# Patient Record
Sex: FEMALE | Race: OTHER | NOT HISPANIC OR LATINO
[De-identification: names, ages, dates, MRNs, and addresses within clinical notes are randomized per-mention and may not be internally consistent; named-entity substitution may affect disease eponyms.]

---

## 2021-11-19 ENCOUNTER — APPOINTMENT (OUTPATIENT)
Dept: INTERNAL MEDICINE | Facility: CLINIC | Age: 51
End: 2021-11-19
Payer: MEDICAID

## 2021-11-19 VITALS
DIASTOLIC BLOOD PRESSURE: 70 MMHG | SYSTOLIC BLOOD PRESSURE: 110 MMHG | HEART RATE: 66 BPM | RESPIRATION RATE: 12 BRPM | OXYGEN SATURATION: 97 % | TEMPERATURE: 98 F

## 2021-11-19 VITALS — HEIGHT: 64 IN | BODY MASS INDEX: 23.05 KG/M2 | WEIGHT: 135 LBS

## 2021-11-19 DIAGNOSIS — Z82.3 FAMILY HISTORY OF STROKE: ICD-10-CM

## 2021-11-19 PROCEDURE — 90686 IIV4 VACC NO PRSV 0.5 ML IM: CPT

## 2021-11-19 PROCEDURE — G0008: CPT

## 2021-11-19 PROCEDURE — 99203 OFFICE O/P NEW LOW 30 MIN: CPT | Mod: 25

## 2021-11-19 RX ORDER — ELECTROLYTES/DEXTROSE
SOLUTION, ORAL ORAL
Refills: 0 | Status: ACTIVE | COMMUNITY
Start: 2021-11-19

## 2021-11-19 RX ORDER — ADHESIVE TAPE 3"X 2.3 YD
50 MCG TAPE, NON-MEDICATED TOPICAL
Refills: 0 | Status: ACTIVE | COMMUNITY
Start: 2021-11-19

## 2021-11-19 NOTE — HISTORY OF PRESENT ILLNESS
[FreeTextEntry8] : ZACKERY MARTELL is a 51 year old female who presents for new patient focused visit, requesting to review her recent labwork, specifically to discuss her cholesterol. She recently emigrated from [place] ~ 1 yr ago; saw new PCP in 9/2021 for physical, transferring care to this office. She has a history of hyperlipidemia, was on diet modifications and "one medication" while in [place]; in early 2020 repeat labs in [facility] were reportedly normal and she believes she was told she could stop the med; cannot recall name of med.\par 9/2021 labs cholesterol elevated, initiated on Atorvastatin but developed moderate achy pains in lower back; stopped med after 2 weeks and body aches resolved. She admits need to cut down on red meat, fried foods. Her mom had h/o CABG ~age 66 and CVA at age 68.\par Denies any chest pains, shortness of breath, dizziness, headaches.

## 2021-11-19 NOTE — PHYSICAL EXAM
[Normal] : no acute distress, well nourished, well developed and well-appearing [Normal Affect] : the affect was normal [Alert and Oriented x3] : oriented to person, place, and time [Normal Insight/Judgement] : insight and judgment were intact [de-identified] : ?generalized restlnessness noted--bilateral arms/torso appear to have involuntary jerking/movements

## 2021-12-23 ENCOUNTER — NON-APPOINTMENT (OUTPATIENT)
Age: 51
End: 2021-12-23

## 2022-01-19 ENCOUNTER — APPOINTMENT (OUTPATIENT)
Dept: INTERNAL MEDICINE | Facility: CLINIC | Age: 52
End: 2022-01-19
Payer: MEDICAID

## 2022-01-19 VITALS
OXYGEN SATURATION: 97 % | TEMPERATURE: 98.2 F | DIASTOLIC BLOOD PRESSURE: 70 MMHG | RESPIRATION RATE: 12 BRPM | SYSTOLIC BLOOD PRESSURE: 120 MMHG | HEART RATE: 62 BPM

## 2022-01-19 PROCEDURE — 99213 OFFICE O/P EST LOW 20 MIN: CPT | Mod: 25

## 2022-01-19 NOTE — PLAN
[FreeTextEntry1] : COVID19 recovered.\par labs to f/u on lipids, etc. Plan to Rx Atorvastatin at 20 mg if LDL controlled, increase dose if needed.\par F/u for CPE

## 2022-01-19 NOTE — HISTORY OF PRESENT ILLNESS
[FreeTextEntry1] : followup chronic condition/med renewal [de-identified] : ZACKERY MARTELL is a 51 year old female with history of hyperlipidemia who presents for medical followup. She has been taking Atorvastatin 20 from [facility] for HLD, ran out of it 3 days ago. Last visit she said she had body aches on Atorvastatin, but now thinks it was a different medication.\jose miguel Had COVID in 12/2021, fully recovered. She is fully vaccinated.

## 2022-01-21 LAB
ALBUMIN SERPL ELPH-MCNC: 4.7 G/DL
ALP BLD-CCNC: 83 U/L
ALT SERPL-CCNC: 11 U/L
ANION GAP SERPL CALC-SCNC: 14 MMOL/L
AST SERPL-CCNC: 15 U/L
BASOPHILS # BLD AUTO: 0.06 K/UL
BASOPHILS NFR BLD AUTO: 1 %
BILIRUB SERPL-MCNC: 0.5 MG/DL
BUN SERPL-MCNC: 15 MG/DL
CALCIUM SERPL-MCNC: 10.3 MG/DL
CHLORIDE SERPL-SCNC: 105 MMOL/L
CHOLEST SERPL-MCNC: 185 MG/DL
CO2 SERPL-SCNC: 24 MMOL/L
CREAT SERPL-MCNC: 0.89 MG/DL
EOSINOPHIL # BLD AUTO: 0.29 K/UL
EOSINOPHIL NFR BLD AUTO: 5 %
ESTIMATED AVERAGE GLUCOSE: 114 MG/DL
GLUCOSE SERPL-MCNC: 88 MG/DL
HBA1C MFR BLD HPLC: 5.6 %
HCT VFR BLD CALC: 39.3 %
HDLC SERPL-MCNC: 57 MG/DL
HGB BLD-MCNC: 12.5 G/DL
IMM GRANULOCYTES NFR BLD AUTO: 0.2 %
LDLC SERPL CALC-MCNC: 103 MG/DL
LYMPHOCYTES # BLD AUTO: 2.02 K/UL
LYMPHOCYTES NFR BLD AUTO: 34.6 %
MAN DIFF?: NORMAL
MCHC RBC-ENTMCNC: 30.3 PG
MCHC RBC-ENTMCNC: 31.8 GM/DL
MCV RBC AUTO: 95.4 FL
MONOCYTES # BLD AUTO: 0.43 K/UL
MONOCYTES NFR BLD AUTO: 7.4 %
NEUTROPHILS # BLD AUTO: 3.02 K/UL
NEUTROPHILS NFR BLD AUTO: 51.8 %
NONHDLC SERPL-MCNC: 128 MG/DL
PLATELET # BLD AUTO: 310 K/UL
POTASSIUM SERPL-SCNC: 5.4 MMOL/L
PROT SERPL-MCNC: 7.7 G/DL
RBC # BLD: 4.12 M/UL
RBC # FLD: 13.3 %
SODIUM SERPL-SCNC: 142 MMOL/L
TRIGL SERPL-MCNC: 126 MG/DL
TSH SERPL-ACNC: 0.77 UIU/ML
WBC # FLD AUTO: 5.83 K/UL

## 2022-02-14 ENCOUNTER — APPOINTMENT (OUTPATIENT)
Dept: INTERNAL MEDICINE | Facility: CLINIC | Age: 52
End: 2022-02-14
Payer: MEDICAID

## 2022-02-14 VITALS — BODY MASS INDEX: 22.96 KG/M2 | HEIGHT: 64 IN | WEIGHT: 134.5 LBS

## 2022-02-14 VITALS
DIASTOLIC BLOOD PRESSURE: 70 MMHG | RESPIRATION RATE: 12 BRPM | HEART RATE: 80 BPM | OXYGEN SATURATION: 98 % | SYSTOLIC BLOOD PRESSURE: 118 MMHG

## 2022-02-14 PROCEDURE — 99214 OFFICE O/P EST MOD 30 MIN: CPT

## 2022-02-14 NOTE — PHYSICAL EXAM
[No JVD] : no jugular venous distention [Coordination Grossly Intact] : coordination grossly intact [No Focal Deficits] : no focal deficits [Normal Gait] : normal gait [Normal] : affect was normal and insight and judgment were intact [de-identified] : involuntary tremors/jerking of upper extremities

## 2022-02-14 NOTE — PLAN
[FreeTextEntry1] : #Involuntary jerking: per patient has had chronically without change, at this time defers workup, will discuss again during upcoming physical\par #HLD: on Atorvastatin, low fat diet.\par F/u for next available CPE

## 2022-02-28 ENCOUNTER — NON-APPOINTMENT (OUTPATIENT)
Age: 52
End: 2022-02-28

## 2022-02-28 ENCOUNTER — APPOINTMENT (OUTPATIENT)
Dept: INTERNAL MEDICINE | Facility: CLINIC | Age: 52
End: 2022-02-28
Payer: MEDICAID

## 2022-02-28 VITALS
HEART RATE: 68 BPM | SYSTOLIC BLOOD PRESSURE: 112 MMHG | OXYGEN SATURATION: 96 % | DIASTOLIC BLOOD PRESSURE: 70 MMHG | RESPIRATION RATE: 12 BRPM | TEMPERATURE: 98.2 F

## 2022-02-28 VITALS — WEIGHT: 135 LBS | HEIGHT: 64 IN | BODY MASS INDEX: 23.05 KG/M2

## 2022-02-28 DIAGNOSIS — Z23 ENCOUNTER FOR IMMUNIZATION: ICD-10-CM

## 2022-02-28 DIAGNOSIS — Z00.00 ENCOUNTER FOR GENERAL ADULT MEDICAL EXAMINATION W/OUT ABNORMAL FINDINGS: ICD-10-CM

## 2022-02-28 PROCEDURE — 99396 PREV VISIT EST AGE 40-64: CPT

## 2022-02-28 PROCEDURE — 99214 OFFICE O/P EST MOD 30 MIN: CPT | Mod: 25

## 2022-02-28 RX ORDER — SERTRALINE HYDROCHLORIDE 50 MG/1
50 TABLET, FILM COATED ORAL
Qty: 30 | Refills: 0 | Status: COMPLETED | COMMUNITY
Start: 2021-10-29

## 2022-02-28 RX ORDER — PSYLLIUM HUSK 0.4 G
1000-800 CAPSULE ORAL
Qty: 30 | Refills: 0 | Status: COMPLETED | COMMUNITY
Start: 2021-10-05

## 2022-02-28 RX ORDER — AMOXICILLIN 500 MG/1
500 CAPSULE ORAL
Qty: 30 | Refills: 0 | Status: COMPLETED | COMMUNITY
Start: 2021-11-25

## 2022-02-28 NOTE — HEALTH RISK ASSESSMENT
[Very Good] : ~his/her~  mood as very good [Never] : Never [No] : In the past 12 months have you used drugs other than those required for medical reasons? No [No falls in past year] : Patient reported no falls in the past year [0] : 2) Feeling down, depressed, or hopeless: Not at all (0) [PHQ-2 Negative - No further assessment needed] : PHQ-2 Negative - No further assessment needed [Patient reported mammogram was normal] : Patient reported mammogram was normal [Patient reported PAP Smear was normal] : Patient reported PAP Smear was normal [HIV test declined] : HIV test declined [Hepatitis C test declined] : Hepatitis C test declined [None] : None [With Family] : lives with family [Fully functional (bathing, dressing, toileting, transferring, walking, feeding)] : Fully functional (bathing, dressing, toileting, transferring, walking, feeding) [Fully functional (using the telephone, shopping, preparing meals, housekeeping, doing laundry, using] : Fully functional and needs no help or supervision to perform IADLs (using the telephone, shopping, preparing meals, housekeeping, doing laundry, using transportation, managing medications and managing finances) [Reports normal functional visual acuity (ie: able to read med bottle)] : Reports normal functional visual acuity [Smoke Detector] : smoke detector [Carbon Monoxide Detector] : carbon monoxide detector [Safety elements used in home] : safety elements used in home [Seat Belt] :  uses seat belt [Sunscreen] : uses sunscreen [de-identified] : no exercise currently [de-identified] : balanced/healthy [[id number]] : 0 [Change in mental status noted] : No change in mental status noted [Language] : denies difficulty with language [Behavior] : denies difficulty with behavior [Learning/Retaining New Information] : denies difficulty learning/retaining new information [Handling Complex Tasks] : denies difficulty handling complex tasks [Reasoning] : denies difficulty with reasoning [Spatial Ability and Orientation] : denies difficulty with spatial ability and orientation [Reports changes in hearing] : Reports no changes in hearing [Reports changes in vision] : Reports no changes in vision [Reports changes in dental health] : Reports no changes in dental health [TB Exposure] : is not being exposed to tuberculosis [MammogramDate] : 01/20 [PapSmearDate] : 01/20 [ColonoscopyComments] : never had, referred

## 2022-02-28 NOTE — HISTORY OF PRESENT ILLNESS
[FreeTextEntry1] : here for physical [de-identified] : ZACKERY MARTELL is a 51 year old female with history of Hyperlipidemia who presents for annual comprehensive exam. She feels well today, denies any complaints. She moved from [place] ~ 2 years ago, is overdue for some routine tests. \par She has had jerking/movements in her hands/arms over the past few years, but more prominent recently for which her family had taken her to a psychiatrist who initiated her on Sertraline, thinking this was perhaps caused by Anxiety. She doesn't feel anything has changed on Sertraline. Denies feeling anxious or depressed.

## 2022-02-28 NOTE — PHYSICAL EXAM
[Normal Appearance] : normal in appearance [No Masses] : no palpable masses [No Nipple Discharge] : no nipple discharge [No Axillary Lymphadenopathy] : no axillary lymphadenopathy [Coordination Grossly Intact] : coordination grossly intact [No Focal Deficits] : no focal deficits [Normal Gait] : normal gait [Deep Tendon Reflexes (DTR)] : deep tendon reflexes were 2+ and symmetric [Normal] : affect was normal and insight and judgment were intact [de-identified] : involuntary twitching/movements of b/l upper extremities and neck movements--choreiform like. No rigidity.

## 2022-02-28 NOTE — REVIEW OF SYSTEMS
[Negative] : Psychiatric [Headache] : no headache [Dizziness] : no dizziness [Fainting] : no fainting [Confusion] : no confusion [Memory Loss] : no memory loss [Unsteady Walking] : no ataxia

## 2022-02-28 NOTE — PLAN
[FreeTextEntry1] : UTD with covid and flu vaccines. She will consider Shingrix\par Mammogram Rx\par F/u with gyn in 1 yr for PAP smear\par Livia-menopausal--menses irregular--LMP last week, prior to that was ~ 3 months ago. \par F/u 3m f/u lipids, check Vit D, UA.

## 2022-04-21 ENCOUNTER — APPOINTMENT (OUTPATIENT)
Dept: GASTROENTEROLOGY | Facility: CLINIC | Age: 52
End: 2022-04-21

## 2022-05-04 ENCOUNTER — APPOINTMENT (OUTPATIENT)
Dept: NEUROLOGY | Facility: CLINIC | Age: 52
End: 2022-05-04

## 2022-05-24 ENCOUNTER — APPOINTMENT (OUTPATIENT)
Dept: NEUROLOGY | Facility: CLINIC | Age: 52
End: 2022-05-24

## 2022-05-24 ENCOUNTER — APPOINTMENT (OUTPATIENT)
Dept: INTERNAL MEDICINE | Facility: CLINIC | Age: 52
End: 2022-05-24
Payer: MEDICAID

## 2022-05-24 VITALS — BODY MASS INDEX: 23.39 KG/M2 | HEIGHT: 64 IN | WEIGHT: 137 LBS
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
## 2022-05-24 DIAGNOSIS — R25.8 OTHER ABNORMAL INVOLUNTARY MOVEMENTS: ICD-10-CM

## 2022-05-24 PROCEDURE — 99213 OFFICE O/P EST LOW 20 MIN: CPT

## 2022-05-24 NOTE — HISTORY OF PRESENT ILLNESS
[FreeTextEntry1] : s/p recent trip/fall [de-identified] : ZACKERY MARTELL is a 51 year old female with history of Hyperlipidemia who presents for fol;owup appt. Accompaniedby her .\par 1 week ago while at [employer], she forgot keys/phone in car, was walking home (was closeby to store), and tripped/fell face first--contacted the ground with right side of forehead, chin, bilateral knees. Denies any loss of consciousness. Had scrapes on knees, covered with bandages, has been applying antibiotic cream. \par SHe has upcoming appt in 2 days with neurologist for evaluation of her abnormal bodily movements.

## 2022-05-24 NOTE — PHYSICAL EXAM
[Normal] : normal rate, regular rhythm, normal S1 and S2 and no murmur heard [No Joint Swelling] : no joint swelling [Grossly Normal Strength/Tone] : grossly normal strength/tone [No Focal Deficits] : no focal deficits [Normal Affect] : the affect was normal [Normal Insight/Judgement] : insight and judgment were intact [de-identified] : right knee--healed abrasion, left knee ~ 1 cm abrasion--clean/dry, bandaged. chin healed abrasion. Normal palpatory exam of head. [de-identified] : some incoordination in walking--short steps

## 2022-05-24 NOTE — PLAN
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 She will consider Shingrix\par Mammogram reminded her to schedule.

## 2022-05-26 ENCOUNTER — NON-APPOINTMENT (OUTPATIENT)
Age: 52
End: 2022-05-26

## 2022-05-26 ENCOUNTER — LABORATORY RESULT (OUTPATIENT)
Age: 52
End: 2022-05-26

## 2022-05-26 ENCOUNTER — APPOINTMENT (OUTPATIENT)
Dept: NEUROLOGY | Facility: CLINIC | Age: 52
End: 2022-05-26
Payer: MEDICAID

## 2022-05-26 VITALS
HEIGHT: 64 IN | HEART RATE: 77 BPM | SYSTOLIC BLOOD PRESSURE: 100 MMHG | BODY MASS INDEX: 23.39 KG/M2 | WEIGHT: 137 LBS | DIASTOLIC BLOOD PRESSURE: 60 MMHG | TEMPERATURE: 97.8 F

## 2022-05-26 DIAGNOSIS — R29.6 REPEATED FALLS: ICD-10-CM

## 2022-05-26 PROCEDURE — 99203 OFFICE O/P NEW LOW 30 MIN: CPT

## 2022-05-26 NOTE — HISTORY OF PRESENT ILLNESS
[FreeTextEntry1] : 51-year-old woman with significant medical history, has been complaining for the last 6-7 years old intensifying persistent,excessive fidgeting, fingers, hands, feet are intermittently and uncontrollably moving. Not associated with pain, numbness, weakness, no history of any head trauma or infectious causes. No prior history of stroke or seizures.No trouble sleeping.\par Patient can by tightening the extremity control the movement, but eventually returns. She is has been falling more frequently,  reports She denies any difficulty using her extremities cooking, cleaning. Not dropping things.\par No change in memory,behavior, although has been more irritable for about a year or so, started on sertraline 25 mg once a day. No family history of similar complaints.

## 2022-05-26 NOTE — PHYSICAL EXAM
[General Appearance - Alert] : alert [General Appearance - In No Acute Distress] : in no acute distress [Oriented To Time, Place, And Person] : oriented to person, place, and time [Impaired Insight] : insight and judgment were intact [Affect] : the affect was normal [Person] : oriented to person [Place] : oriented to place [Time] : oriented to time [Concentration Intact] : normal concentrating ability [Visual Intact] : visual attention was ~T not ~L decreased [Naming Objects] : no difficulty naming common objects [Repeating Phrases] : no difficulty repeating a phrase [Writing A Sentence] : no difficulty writing a sentence [Fluency] : fluency intact [Comprehension] : comprehension intact [Reading] : reading intact [Past History] : adequate knowledge of personal past history [Cranial Nerves Optic (II)] : visual acuity intact bilaterally,  visual fields full to confrontation, pupils equal round and reactive to light [Cranial Nerves Oculomotor (III)] : extraocular motion intact [Cranial Nerves Trigeminal (V)] : facial sensation intact symmetrically [Cranial Nerves Facial (VII)] : face symmetrical [Cranial Nerves Vestibulocochlear (VIII)] : hearing was intact bilaterally [Cranial Nerves Glossopharyngeal (IX)] : tongue and palate midline [Cranial Nerves Accessory (XI - Cranial And Spinal)] : head turning and shoulder shrug symmetric [Cranial Nerves Hypoglossal (XII)] : there was no tongue deviation with protrusion [Motor Tone] : muscle tone was normal in all four extremities [Motor Strength] : muscle strength was normal in all four extremities [No Muscle Atrophy] : normal bulk in all four extremities [Paresis Pronator Drift Right-Sided] : no pronator drift on the right [Motor Strength Upper Extremities Bilaterally] : strength was normal in both upper extremities [Motor Strength Lower Extremities Bilaterally] : strength was normal in both lower extremities [Sensation Tactile Decrease] : light touch was intact [Sensation Pain / Temperature Decrease] : pain and temperature was intact [Proprioception] : proprioception was intact [Romberg's Sign] : Romberg's sign was negtive [Abnormal Walk] : normal gait [Balance] : balance was intact [Past-pointing] : there was no past-pointing [Dysdiadochokinesia Bilaterally] : not present [Coordination - Dysmetria Impaired Finger-to-Nose Bilateral] : present bilaterally [Coordination - Dysmetria Impaired Finger-to-Nose Right Only] : present on the ride side [Coordination - Dysmetria Impaired Finger-to-Nose Left Only] : present on the left side [Coordination - Dysmetria Impaired Heel-to-Shin Bilateral] : not present [2+] : Ankle jerk left 2+ [Plantar Reflex Right Only] : normal on the right [Plantar Reflex Left Only] : normal on the left [___] : absent on the right [___] : absent on the left [FreeTextEntry8] : there are persistent,  intermittent movements of the fingers, toes, hands [Neck Appearance] : the appearance of the neck was normal [] : the neck was supple [Neck Cervical Mass (___cm)] : no neck mass was observed [No Visual Abnormalities] : no visible abnormalities [Normal Lordosis] : normal lordosis [No Scoliosis] : no scoliosis [No Tenderness to Palpation] : no spine tenderness on palpation [No Masses] : no masses [Full ROM] : full ROM [No Pain with ROM] : no pain with motion in any direction

## 2022-05-26 NOTE — DISCUSSION/SUMMARY
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 Unclear etiology, no history of stroke, injury to the head or infection processes. Recent blood work by her primary doctor, and normal thyroid function tests.Normal liver function tests.\par There is a recent history of increasing falls, raises the possibility of spinal cerebellar degeneration,Duval disease, rule out paraneoplastic, up to immune disorders. Rule out structural lesions of the brain.\par Plan:\par MRI of brain with and without gadolinium\par EEG\par ALEXANDRA, rheumatoid factor, antiphospholipid antibodies, anticardiolipin antibodies, SPEP, IPEP, paraneoplastic antibodies, ceruloplasmin, ace, lyme ab, B12,folate, esr, c reactive protein.

## 2022-05-27 ENCOUNTER — APPOINTMENT (OUTPATIENT)
Dept: NEUROLOGY | Facility: CLINIC | Age: 52
End: 2022-05-27
Payer: MEDICAID

## 2022-05-27 LAB
ALBUMIN SERPL ELPH-MCNC: 5 G/DL
ALP BLD-CCNC: 85 U/L
ALT SERPL-CCNC: 23 U/L
ANION GAP SERPL CALC-SCNC: 16 MMOL/L
AST SERPL-CCNC: 22 U/L
BILIRUB SERPL-MCNC: 0.5 MG/DL
BUN SERPL-MCNC: 17 MG/DL
CALCIUM SERPL-MCNC: 10 MG/DL
CHLORIDE SERPL-SCNC: 101 MMOL/L
CHOLEST SERPL-MCNC: 224 MG/DL
CO2 SERPL-SCNC: 24 MMOL/L
CREAT SERPL-MCNC: 0.74 MG/DL
EGFR: 97 ML/MIN/1.73M2
GLUCOSE SERPL-MCNC: 88 MG/DL
HDLC SERPL-MCNC: 73 MG/DL
LDLC SERPL CALC-MCNC: 130 MG/DL
NONHDLC SERPL-MCNC: 152 MG/DL
POTASSIUM SERPL-SCNC: 4.7 MMOL/L
PROT SERPL-MCNC: 7.6 G/DL
SODIUM SERPL-SCNC: 141 MMOL/L
TRIGL SERPL-MCNC: 108 MG/DL

## 2022-05-27 PROCEDURE — 95816 EEG AWAKE AND DROWSY: CPT

## 2022-05-31 LAB
ACE BLD-CCNC: 76 U/L
ALBUMIN MFR SERPL ELPH: 57.1 %
ALBUMIN SERPL-MCNC: 4.1 G/DL
ALBUMIN/GLOB SERPL: 1.3 RATIO
ALPHA1 GLOB MFR SERPL ELPH: 3.3 %
ALPHA1 GLOB SERPL ELPH-MCNC: 0.2 G/DL
ALPHA2 GLOB MFR SERPL ELPH: 10.1 %
ALPHA2 GLOB SERPL ELPH-MCNC: 0.7 G/DL
B-GLOBULIN MFR SERPL ELPH: 12.6 %
B-GLOBULIN SERPL ELPH-MCNC: 0.9 G/DL
CALCIUM SERPL-MCNC: 9.8 MG/DL
CARDIOLIPIN AB SER IA-ACNC: NEGATIVE
CERULOPLASMIN SERPL-MCNC: 27 MG/DL
CK SERPL-CCNC: 71 U/L
COPPER SERPL-MCNC: 148 UG/DL
CRP SERPL-MCNC: <3 MG/L
DEPRECATED KAPPA LC FREE/LAMBDA SER: 1.47 RATIO
DSDNA AB SER-ACNC: 20 IU/ML
ENA SCL70 IGG SER IA-ACNC: <0.2 AL
ERYTHROCYTE [SEDIMENTATION RATE] IN BLOOD BY WESTERGREN METHOD: 3 MM/HR
FOLATE SERPL-MCNC: 9.8 NG/ML
GAMMA GLOB FLD ELPH-MCNC: 1.2 G/DL
GAMMA GLOB MFR SERPL ELPH: 16.9 %
IGA SER QL IEP: 196 MG/DL
IGG SER QL IEP: 1246 MG/DL
IGM SER QL IEP: 58 MG/DL
INTERPRETATION SERPL IEP-IMP: NORMAL
KAPPA LC CSF-MCNC: 1.35 MG/DL
KAPPA LC SERPL-MCNC: 1.99 MG/DL
M PROTEIN SPEC IFE-MCNC: NORMAL
PARATHYROID HORMONE INTACT: 70 PG/ML
PROT SERPL-MCNC: 7.2 G/DL
PROT SERPL-MCNC: 7.2 G/DL
VIT B12 SERPL-MCNC: 227 PG/ML

## 2022-06-01 DIAGNOSIS — E53.8 DEFICIENCY OF OTHER SPECIFIED B GROUP VITAMINS: ICD-10-CM

## 2022-06-01 LAB
ANA PAT FLD IF-IMP: ABNORMAL
ANA SER IF-ACNC: ABNORMAL

## 2022-06-03 LAB — PARANEOPLASTIC AB PNL SER: NORMAL

## 2022-06-07 ENCOUNTER — APPOINTMENT (OUTPATIENT)
Dept: MRI IMAGING | Facility: CLINIC | Age: 52
End: 2022-06-07
Payer: MEDICAID

## 2022-06-07 ENCOUNTER — OUTPATIENT (OUTPATIENT)
Dept: OUTPATIENT SERVICES | Facility: HOSPITAL | Age: 52
LOS: 1 days | End: 2022-06-07
Payer: MEDICAID

## 2022-06-07 DIAGNOSIS — R29.6 REPEATED FALLS: ICD-10-CM

## 2022-06-07 DIAGNOSIS — R25.8 OTHER ABNORMAL INVOLUNTARY MOVEMENTS: ICD-10-CM

## 2022-06-07 PROCEDURE — 70553 MRI BRAIN STEM W/O & W/DYE: CPT | Mod: 26

## 2022-06-07 PROCEDURE — 70553 MRI BRAIN STEM W/O & W/DYE: CPT

## 2022-06-07 PROCEDURE — A9585: CPT

## 2022-06-09 ENCOUNTER — APPOINTMENT (OUTPATIENT)
Dept: NEUROLOGY | Facility: CLINIC | Age: 52
End: 2022-06-09

## 2022-06-13 ENCOUNTER — APPOINTMENT (OUTPATIENT)
Dept: NEUROLOGY | Facility: CLINIC | Age: 52
End: 2022-06-13
Payer: MEDICAID

## 2022-06-13 VITALS
BODY MASS INDEX: 23.39 KG/M2 | DIASTOLIC BLOOD PRESSURE: 74 MMHG | SYSTOLIC BLOOD PRESSURE: 117 MMHG | WEIGHT: 137 LBS | HEART RATE: 74 BPM | TEMPERATURE: 97.7 F | HEIGHT: 64 IN

## 2022-06-13 DIAGNOSIS — F42.9 OBSESSIVE-COMPULSIVE DISORDER, UNSPECIFIED: ICD-10-CM

## 2022-06-13 PROCEDURE — 99213 OFFICE O/P EST LOW 20 MIN: CPT

## 2022-06-13 NOTE — DATA REVIEWED
[de-identified] : MRI of brain performed June 8, 2022.  Impression: Nonspecific white matter changes.  No abnormal intraparenchymal or leptomeningeal enhancement.  No restricted diffusion.  No acute ischemic event.  Paranasal sinus inflammatory change with presence of secretions appreciated within the left sphenoid sinus. [de-identified] : EEG performed May 27, 2022, impression: Normal awake.

## 2022-06-13 NOTE — REVIEW OF SYSTEMS
[As Noted in HPI] : as noted in HPI [Sleep Disturbances] : sleep disturbances [Anxiety] : anxiety [Change In Personality] : personality change [Negative] : Heme/Lymph

## 2022-06-13 NOTE — HISTORY OF PRESENT ILLNESS
[FreeTextEntry1] : 52-year-old woman accompanied by her .  With complaints for the last several years, of increased anxiety, compulsive thinking, agitation, and as per  aggressive at times.  She also reports intermittent but recurrent\par , Twitches of the extremities.  Worse when she gets anxious.\par Recently evaluated, blood work shows borderline abnormal ALEXANDRA, 1-80, and a low vitamin B12 serum level of 227.  Patient advised and prescribed supplementation orally.\par

## 2022-06-13 NOTE — DISCUSSION/SUMMARY
[FreeTextEntry1] : 52-year-old woman history of compulsive thinking, question of obsessive-compulsive disorder, history of muscle twitches, examination unremarkable so far, MRI unremarkable, blood work except for low serum B12, unrevealing.\par Questionable Christal's disease, will refer for genetic testing.  \par Psychiatric referral for obsessive and compulsive thinking.\par Vitamin B12 deficient, on supplementation.  Repeat serum levels, in 2 months.\par Return to office, 3 months

## 2022-07-06 ENCOUNTER — APPOINTMENT (OUTPATIENT)
Dept: GASTROENTEROLOGY | Facility: CLINIC | Age: 52
End: 2022-07-06

## 2022-08-23 ENCOUNTER — APPOINTMENT (OUTPATIENT)
Dept: INTERNAL MEDICINE | Facility: CLINIC | Age: 52
End: 2022-08-23

## 2022-08-23 VITALS — WEIGHT: 142 LBS | HEIGHT: 64 IN | BODY MASS INDEX: 24.24 KG/M2

## 2022-08-23 PROCEDURE — 99213 OFFICE O/P EST LOW 20 MIN: CPT

## 2022-08-23 RX ORDER — IBUPROFEN 600 MG/1
600 TABLET, FILM COATED ORAL
Qty: 20 | Refills: 0 | Status: ACTIVE | COMMUNITY
Start: 2022-08-23 | End: 1900-01-01

## 2022-08-23 NOTE — HISTORY OF PRESENT ILLNESS
[FreeTextEntry8] : ZACKERY MARTELL is a 52 year old female who presents for evaluation of left thumb pain. Pain is localized to lateral side of left wrist/thumb, ~4-5/10, achy, constant.\par Onset ~ 1.5 weeks ago and has been persistent. No apparent trauma to the hand/wrist/thumb. She does have generalized repetitive movements in wrist/fingers from underlying neurological  disorder--follows with neurologist.

## 2022-08-23 NOTE — PLAN
[FreeTextEntry1] : Advised her on ice, Ibuprofen on full stomach as needed for pain, use thumb spica splint daily for 7 days (remove at night). If pain persists beyond a week then to call me t update, plan on evaluation with hand specialist.\par Needs to f/u with neurology to manage her movement disorder which is likely cause for her current MSK issue

## 2022-08-23 NOTE — PHYSICAL EXAM
[Normal] : no acute distress, well nourished, well developed and well-appearing [de-identified] : left hand/wrist: +tenderness/swelling along lateral aspect of thumb into wrist, +Finkelstein test with reproducible pain [de-identified] : frequent repetitive turning movements of bilateral wrists, fingers noted

## 2022-08-30 ENCOUNTER — APPOINTMENT (OUTPATIENT)
Dept: GASTROENTEROLOGY | Facility: CLINIC | Age: 52
End: 2022-08-30

## 2022-08-30 VITALS
TEMPERATURE: 97.5 F | OXYGEN SATURATION: 97 % | BODY MASS INDEX: 24.24 KG/M2 | HEART RATE: 69 BPM | WEIGHT: 142 LBS | SYSTOLIC BLOOD PRESSURE: 84 MMHG | DIASTOLIC BLOOD PRESSURE: 60 MMHG | HEIGHT: 64 IN

## 2022-08-30 DIAGNOSIS — Z12.11 ENCOUNTER FOR SCREENING FOR MALIGNANT NEOPLASM OF COLON: ICD-10-CM

## 2022-08-30 DIAGNOSIS — Z12.12 ENCOUNTER FOR SCREENING FOR MALIGNANT NEOPLASM OF COLON: ICD-10-CM

## 2022-08-30 PROCEDURE — 99203 OFFICE O/P NEW LOW 30 MIN: CPT

## 2022-08-30 RX ORDER — POLYETHYLENE GLYCOL-3350, SODIUM CHLORIDE, POTASSIUM CHLORIDE AND SODIUM BICARBONATE 420; 11.2; 5.72; 1.48 G/438.4G; G/438.4G; G/438.4G; G/438.4G
420 POWDER, FOR SOLUTION ORAL
Qty: 1 | Refills: 0 | Status: ACTIVE | COMMUNITY
Start: 2022-08-30 | End: 1900-01-01

## 2022-08-30 NOTE — HISTORY OF PRESENT ILLNESS
[FreeTextEntry1] : New patient presents to the office today to arrange for colonoscopy.  This will be her first screening exam.  Patient feels well offers no complaints of nausea vomiting fever chills rectal bleeding or melena.  Patient has no cardiac or pulmonary complaints.  Past medical history was reviewed.

## 2022-08-30 NOTE — ASSESSMENT
[FreeTextEntry1] : Impression and plan\par Patient came to the office today to arrange for colonoscopy.  The risks benefits alternatives and limitations were discussed.  I will make further suggestions after above testing is complete.  Patient's son was present during the examination to act as an .  Time was given to answer all questions.

## 2022-09-21 ENCOUNTER — APPOINTMENT (OUTPATIENT)
Dept: NEUROLOGY | Facility: CLINIC | Age: 52
End: 2022-09-21

## 2022-09-28 DIAGNOSIS — F41.9 ANXIETY DISORDER, UNSPECIFIED: ICD-10-CM

## 2022-11-02 ENCOUNTER — APPOINTMENT (OUTPATIENT)
Dept: GASTROENTEROLOGY | Facility: CLINIC | Age: 52
End: 2022-11-02

## 2022-11-23 ENCOUNTER — APPOINTMENT (OUTPATIENT)
Dept: INTERNAL MEDICINE | Facility: CLINIC | Age: 52
End: 2022-11-23

## 2022-11-23 VITALS
HEIGHT: 64 IN | BODY MASS INDEX: 24.41 KG/M2 | WEIGHT: 143 LBS | DIASTOLIC BLOOD PRESSURE: 60 MMHG | SYSTOLIC BLOOD PRESSURE: 110 MMHG

## 2022-11-23 DIAGNOSIS — Z20.822 ENCOUNTER FOR PREPROCEDURAL LABORATORY EXAMINATION: ICD-10-CM

## 2022-11-23 DIAGNOSIS — M54.50 LOW BACK PAIN, UNSPECIFIED: ICD-10-CM

## 2022-11-23 DIAGNOSIS — Z01.812 ENCOUNTER FOR PREPROCEDURAL LABORATORY EXAMINATION: ICD-10-CM

## 2022-11-23 PROCEDURE — 99214 OFFICE O/P EST MOD 30 MIN: CPT | Mod: 25

## 2022-11-23 RX ORDER — CYCLOBENZAPRINE HYDROCHLORIDE 10 MG/1
10 TABLET, FILM COATED ORAL EVERY 8 HOURS
Qty: 1 | Refills: 0 | Status: ACTIVE | COMMUNITY
Start: 2022-11-23 | End: 1900-01-01

## 2022-11-23 RX ORDER — MELOXICAM 15 MG/1
15 TABLET ORAL
Qty: 10 | Refills: 0 | Status: ACTIVE | COMMUNITY
Start: 2022-11-23 | End: 1900-01-01

## 2022-11-23 NOTE — PHYSICAL EXAM
[No Acute Distress] : no acute distress [Well Nourished] : well nourished [PERRL] : pupils equal round and reactive to light [EOMI] : extraocular movements intact [No Accessory Muscle Use] : no accessory muscle use [Clear to Auscultation] : lungs were clear to auscultation bilaterally [Regular Rhythm] : with a regular rhythm [Normal S1, S2] : normal S1 and S2 [No Murmur] : no murmur heard [No Spinal Tenderness] : no spinal tenderness [Normal] : the cranial nerves were intact [Sensation Tactile Decrease] : light touch was intact [2+] : left 2+ [Normal Affect] : the affect was normal [Normal Insight/Judgement] : insight and judgment were intact

## 2022-11-23 NOTE — HISTORY OF PRESENT ILLNESS
[FreeTextEntry8] : cc: R. sided LBP  \par \par 4-5 days of R. sided LBP radiating to R. buttock - occurred after spending several hours in kitchen \par feels dull, achy \par worse with sitting \par pain is 9/10 \par took 2-3 ibuprofen didn't help \par took 2 alleve didn't really help\par \par no f/chills \par no paresthesia \par no bowel or bladder incontinence \par

## 2022-11-30 ENCOUNTER — APPOINTMENT (OUTPATIENT)
Dept: GASTROENTEROLOGY | Facility: HOSPITAL | Age: 52
End: 2022-11-30

## 2023-05-02 ENCOUNTER — APPOINTMENT (OUTPATIENT)
Dept: INTERNAL MEDICINE | Facility: CLINIC | Age: 53
End: 2023-05-02
Payer: MEDICAID

## 2023-05-02 VITALS
RESPIRATION RATE: 14 BRPM | OXYGEN SATURATION: 97 % | HEART RATE: 94 BPM | TEMPERATURE: 97 F | SYSTOLIC BLOOD PRESSURE: 118 MMHG | DIASTOLIC BLOOD PRESSURE: 72 MMHG

## 2023-05-02 DIAGNOSIS — J06.9 ACUTE UPPER RESPIRATORY INFECTION, UNSPECIFIED: ICD-10-CM

## 2023-05-02 DIAGNOSIS — E78.5 HYPERLIPIDEMIA, UNSPECIFIED: ICD-10-CM

## 2023-05-02 LAB — S PYO AG SPEC QL IA: NEGATIVE

## 2023-05-02 PROCEDURE — 87880 STREP A ASSAY W/OPTIC: CPT | Mod: QW

## 2023-05-02 PROCEDURE — 99214 OFFICE O/P EST MOD 30 MIN: CPT | Mod: 25

## 2023-05-02 RX ORDER — ATORVASTATIN CALCIUM 20 MG/1
20 TABLET, FILM COATED ORAL
Qty: 90 | Refills: 3 | Status: ACTIVE | COMMUNITY
Start: 2021-10-05 | End: 1900-01-01

## 2023-05-02 NOTE — PLAN
[FreeTextEntry1] : #Acute URI: rapid strep negative; throat culture and nasal swab taken, will f/u. Advised to drink plenty of fluids, she defers nasal sprays, will try steam inhalation, Mucinex and continue Claritin.\par #Hyperlipidemia: restart Atorvastatin, low saturated fat diet, f/u for CPE/labs.

## 2023-05-02 NOTE — HISTORY OF PRESENT ILLNESS
[FreeTextEntry8] : ZACKERY MARTELL is a 52 year old female who presents for acute medical evaluation, c/o congestion, sore throat and to discuss recent lab results.\par -Returned from [place] 1 week ago; prior to leaving, she developed nasal congestion, cough; no fever/chills. Cough has resolved, still with some nasal congestion, but over past 2-3 days developed new sore throat, worse at nighttime/early mornings. Tried Mucinex a few times, thinks it helped reduce congestion; and daily Claritin x 2 days. \par She has not taken any COVID19 tests. \par She also had labs done while in [facility] last month, cholesterol is high. She stopped Atorvastatin ~6-7 months ago as she was under the impression since her cholesterol was normal that she could stop the medication.\par \par

## 2023-05-02 NOTE — DATA REVIEWED
[FreeTextEntry1] : LDL result from 4/18/2023 (from [facility] physician),

## 2023-05-02 NOTE — PHYSICAL EXAM
[No Acute Distress] : no acute distress [Well Nourished] : well nourished [Normal Sclera/Conjunctiva] : normal sclera/conjunctiva [Normal Outer Ear/Nose] : the outer ears and nose were normal in appearance [No Lymphadenopathy] : no lymphadenopathy [Normal] : normal rate, regular rhythm, normal S1 and S2 and no murmur heard [No Edema] : there was no peripheral edema [de-identified] : +nasal congestion, +post nasal drip. Normal appearance of pharynx, normal TMs, no sinus tenderness [de-identified] : jerking movements of head, extremities similar as prior exam

## 2023-05-05 LAB
BACTERIA THROAT CULT: NORMAL
INFLUENZA A RESULT: NOT DETECTED
INFLUENZA B RESULT: NOT DETECTED
RESP SYN VIRUS RESULT: NOT DETECTED
SARS-COV-2 RESULT: NOT DETECTED

## 2023-06-07 ENCOUNTER — RX RENEWAL (OUTPATIENT)
Age: 53
End: 2023-06-07

## 2023-06-21 ENCOUNTER — APPOINTMENT (OUTPATIENT)
Dept: INTERNAL MEDICINE | Facility: CLINIC | Age: 53
End: 2023-06-21
Payer: MEDICAID

## 2023-06-21 VITALS — BODY MASS INDEX: 24.75 KG/M2 | WEIGHT: 145 LBS | HEIGHT: 64 IN

## 2023-06-21 VITALS — TEMPERATURE: 97.6 F

## 2023-06-21 PROCEDURE — 99213 OFFICE O/P EST LOW 20 MIN: CPT

## 2023-06-21 NOTE — HISTORY OF PRESENT ILLNESS
[FreeTextEntry8] : ZACKERY MARTELL is a 53 year old female who presents for acute medical evaluation, c/o thumb weakness and hand swelling.\par Onset ~ 2 months ago--while washing a glass vase she put hand inside and vase broke, sustained cuts to right hand--described as 2 cuts to lateral hand, one cut on thumb and one cut "deep" into dorsum of hand near thumb. She declined to get medical attention at the time, self-treated with antibiotic cream and bandaids. The cuts healed,  but she noticed her thumb is "weak" and she has some residual swelling in her wrist.

## 2023-06-21 NOTE — PHYSICAL EXAM
[No Acute Distress] : no acute distress [No Respiratory Distress] : no respiratory distress  [No Extremity Clubbing/Cyanosis] : no extremity clubbing/cyanosis [Normal Gait] : normal gait [de-identified] : Right upper extremity: dorsal wrist slight nontender swelling, scar noted on dorsal hand with slight loss of muscle tone in thenar eminence, thumb reduced strength in extension/abduction

## 2023-06-21 NOTE — PLAN
[FreeTextEntry1] : Discussed with patient on above, suspect possible injury to hand tendon, recommend hand surgery evaluation. Referral placed and contacts provided. Advised to use ice and may try Naproxen or Motrin with food to help reduce swelling.

## 2023-06-30 ENCOUNTER — APPOINTMENT (OUTPATIENT)
Dept: ORTHOPEDIC SURGERY | Facility: CLINIC | Age: 53
End: 2023-06-30
Payer: MEDICAID

## 2023-06-30 PROCEDURE — 73130 X-RAY EXAM OF HAND: CPT | Mod: RT

## 2023-06-30 PROCEDURE — 99204 OFFICE O/P NEW MOD 45 MIN: CPT

## 2023-06-30 NOTE — HISTORY OF PRESENT ILLNESS
[8] : 8 [Dull/Aching] : dull/aching [Intermittent] : intermittent [Nothing helps with pain getting better] : Nothing helps with pain getting better [de-identified] : 6/30/23: 52yo RHD female (not working) presents for RIGHT hand pain and swelling x 2 months. She was washing a glass jug, and it broke, lacerating her hand. She also reports that she has been unable to straighten the tip of her thumb since the injury.\par \par Hx: ?OCD - awaiting psych eval. Involuntary jerking movements. Anxiety. HLD.\par Sx: removal of ovarian cyst. [] : no [FreeTextEntry5] : RONBRIGHT 53 year old F here for Rt hand, onset pain for about 2 months,  pt was washing a glass and it fell on her hand \par pt uses antibiotic cream for the cuts

## 2023-06-30 NOTE — ASSESSMENT
[FreeTextEntry1] : The condition was explained to the patient.\par Anticipate permanent inability to extend thumb IPJ and retropulse thumb with non-operative treatment.\par Given chronicity of injury, unlikely to be amenable to primary repair, would likely need reconstruction with PL autograft.\par - MRI R thumb to evaluate level of EPL laceration.\par \par F/u after MRI.

## 2023-06-30 NOTE — IMAGING
[de-identified] : RIGHT HAND\par multiple scars throughout dorsal hand. mild TTP to scar over dorsal radial wrist.\par skin intact. no swelling.\par mild TTP to 2nd webspace.\par wrist ROM: good extension, flexion.\par unable to extend thumb IPJ, good FPL. good finger extension, flex to full fist.\par SILT to median, ulnar, radial distribution. \par brisk cap refill all digits.\par no triggering.\par \par \par XRAYS OF RIGHT HAND: no acute displaced fracture or dislocation. mild djd of DRUJ. mild djd of MPJ of index, middle fingers.

## 2023-07-13 ENCOUNTER — FORM ENCOUNTER (OUTPATIENT)
Age: 53
End: 2023-07-13

## 2023-07-13 ENCOUNTER — APPOINTMENT (OUTPATIENT)
Dept: MRI IMAGING | Facility: CLINIC | Age: 53
End: 2023-07-13

## 2023-07-14 ENCOUNTER — APPOINTMENT (OUTPATIENT)
Dept: MRI IMAGING | Facility: CLINIC | Age: 53
End: 2023-07-14
Payer: MEDICAID

## 2023-07-14 PROCEDURE — 73221 MRI JOINT UPR EXTREM W/O DYE: CPT | Mod: RT

## 2023-07-17 ENCOUNTER — APPOINTMENT (OUTPATIENT)
Dept: ORTHOPEDIC SURGERY | Facility: CLINIC | Age: 53
End: 2023-07-17

## 2023-07-19 ENCOUNTER — NON-APPOINTMENT (OUTPATIENT)
Age: 53
End: 2023-07-19

## 2023-07-20 NOTE — ASSESSMENT
Physical Therapy Visit    Visit Type: Daily Treatment Note  Visit: 6  Referring Provider: Jana Albarado,*  Medical Diagnosis (from order): Diagnosis Information    Diagnosis  959.2 (ICD-9-CM) - S49.91XA (ICD-10-CM) - Injury of right shoulder, initial encounter         SUBJECTIVE                                                                                                               Patient states that he is doing well. He states that he is sore when lifting over 10# but doesn't have pain. Continues to work on exercises and they are helping.       OBJECTIVE                                                                                                                                       Treatment    Un-attended Electrical Stimulation (45793/): Interferential Current  - Purpose: pain relief  - Location: right shoulder   - Position: sitting  - Intensity: patient comfort  - Delivered via: pads  - In conjunction with: cold pack  - Duration: 15 minutes    Results: decreased pain      Therapeutic Exercise  -Arm bike 5 min  -pec stretch 3x30se  -trx rows x20  -shoulder abd palms down x10 8#  -bent rows 10# x20  -re-bounder x20 red      Therapeutic Activity  Overhead lifting 15# x20  Lifting from floor to waist 15# x20  Push pull sled 97# x2  Overhead shoulder press x15    Skilled input: verbal instruction/cues and tactile instruction/cues    Writer verbally educated and received verbal consent for hand placement, positioning of patient, and techniques to be performed today from patient for clothing adjustments for techniques, therapist position for techniques and hand placement and palpation for techniques as described above and how they are pertinent to the patient's plan of care.  Home Exercise Program  Access Code: [email or web address]  URL: [email or web address]  Date: 07/13/2023  Prepared by: Maribel Cochran    Exercises  - Standing Shoulder Extension with Dowel  - 10 reps  - Sitting  [FreeTextEntry1] : #Hyperlipidemia: LDL noted to be 166 in 9/2021, was unable to tolerative Atorvastatin 2/2 myalgias. Was on another med while in [facility]--she will bring name to f/u visit. Discussed at length on lowering saturated fats in diet, increase exercise and healthy fats such as avocado, nuts, olive oil.\par #Microscopic hematuria: perimenopausal, often spots--perhaps cause of microscopic hematuria on recent test. Plan to repeat on f/u visit, ensure no significant spotting when gives urine sample.\par Noted slight abnormal seemingly involuntary bodily movements on exam, patient wishes to focus on HLD, will return in 3m and will discuss further on these. shoulder flexion AAROM  - 2 x daily - 10 reps  - Shoulder Scaption AAROM with Dowel  - 2 x daily - 10 reps  - Isometric Shoulder Flexion at Wall (Mirrored)  - 2 x daily - 5-10 reps - 5 seconds hold  - Isometric Shoulder Abduction at Wall  - 2 x daily - 5-10 reps - 5 seconds hold  - Standing Isometric Shoulder Internal Rotation at Doorway  - 2 x daily - 5-10 reps - 5 seconds hold  - Standing Isometric Shoulder External Rotation with Doorway (Mirrored)  - 2 x daily - 5-10 reps - 5 seconds hold  - Standing Row with Anchored Resistance  - 2 x daily - 2 sets - 10 reps  - Shoulder Extension with Resistance  - 2 x daily - 2 sets - 10 reps  - Bilateral Shoulder Flexion Wall Slide with Towel  - 2 x daily - 5 x weekly - 2 sets - 10 reps - 30 hold  - Standing Shoulder Abduction Wall Slide with Thumb Out  - 2 x daily - 5 x weekly - 2 sets - 10 reps - 30 hold  - Standing Shoulder Horizontal Abduction with Resistance  - 2 x daily - 5 x weekly - 2 sets - 10 reps - 30 hold      ASSESSMENT                                                                                                            Patient is doing well. Continues to progress well with AROM, improved overhead endurance and strength. Patient demonstrates good body mechanics with lifting.   Education:   - Results of above outlined education: Verbalizes understanding, Demonstrates understanding and Needs reinforcement    PLAN                                                                                                                           Suggestions for next session as indicated: Progress per plan of care       Therapy procedure time and total treatment time can be found documented on the Time Entry flowsheet

## 2023-07-27 ENCOUNTER — APPOINTMENT (OUTPATIENT)
Dept: ORTHOPEDIC SURGERY | Facility: CLINIC | Age: 53
End: 2023-07-27

## 2023-07-27 DIAGNOSIS — R29.898 OTHER SYMPTOMS AND SIGNS INVOLVING THE MUSCULOSKELETAL SYSTEM: ICD-10-CM

## 2023-07-27 NOTE — DATA REVIEWED
[MRI] : MRI [Right] : of the right [Wrist] : wrist [Report was reviewed and noted in the chart] : The report was reviewed and noted in the chart [I reviewed the films/CD] : I reviewed the films/CD [FreeTextEntry1] : [facility] MRI RIGHT WRIST IMPRESSIONS: 07/17/23\par 1. Findings consistent with extensor pollicis longus disruption with approximate 3 cm gap suspected and \par retraction proximal to the Kirk's tubercle in the proximal wrist.\par 2. Severe de Quervain's tendinopathy and tenosynovitis, mild extensor digitorum tenosynovitis and mild \par extensor carpi ulnaris tendinitis.\par 3. Partial tearing of the scapholunate ligament and degeneration and fraying involving the body of the TFCC complex with mild first CMC arthrosis.\par 4. No acute fracture or malalignment.\par 5. Clinical correlation regarding extensor pollicis longus tendon dysfunction as well as first extensor tendon compartment dysfunction and recent trauma is recommended.

## 2023-07-27 NOTE — DISCUSSION/SUMMARY
[de-identified] : Follow up with Dr. Weir \par Instructions: Dx / Natural History\par The patient was advised of the diagnosis.  The natural history of the pathology was explained in full to the patient in layman's terms.  Several different treatment options were discussed and explained in full to the patient including the risks and benefits of both surgical and non-surgical treatments.  All questions and concerns were answered. \par \par RICE\par I explained to the patient that rest, ice, compression, and elevation would benefit them.  They may return to activity after follow-up or when they no longer have any pain.\par \par NSAIDs - OTC\par Patient is to begin over the counter oral anti-inflammatory medications on an as needed basis, as long as there are no medical contraindications.  Patient is counseled on possible GI and blood pressure side effects.\par \par Pain Guide Activities\par The patient was advised to let pain guide the gradual advancement of activities.\par \par Icing\par The patient was advised to apply ice (wrapped in a towel or protective covering) to the area daily (20 minutes at a time, 2-4X/day).\par \par All of the patient's questions were answered to Her satisfaction. Diagnoses and potential treatments were reviewed. She agreed with the plan and would like to move forward with it.\par

## 2023-07-27 NOTE — HISTORY OF PRESENT ILLNESS
[de-identified] : 07/27/23: ZACKERY is a 53 year year old R hand dominant female who presents today complaining of right wrist pain \par Date of Injury/Onset: 2 months \par Pain At Rest: 5 /10 \par Pain With Activity:  7/10 \par Mechanism of injury: pt broke a glass while her hand was inside a jug\par This not a Work Related Injury being treated under Worker's Compensation.\par This not an athletic injury occurring associated with an interscholastic or organized sports team.\par Quality of symptoms: burning shooting stabbing, limited ROM (thumb&wrist) \par Improves with: rest \par Worse with: activity  \par Prior treatment: mri \par Prior Imaging: mri \par Reports Available For Review Today: mri \par Out of work/sport: none \par School/Sport/Position/Occupation: none \par Additional Information: Pt was initially seen by dr brennan and was told to follow up for surgical consult \par

## 2023-07-27 NOTE — REVIEW OF SYSTEMS
Clear bilaterally, pupils equal, round and reactive to light. [Joint Pain] : joint pain [Joint Stiffness] : joint stiffness [Joint Swelling] : joint swelling [Negative] : Heme/Lymph

## 2023-07-28 ENCOUNTER — APPOINTMENT (OUTPATIENT)
Dept: ORTHOPEDIC SURGERY | Facility: CLINIC | Age: 53
End: 2023-07-28
Payer: MEDICAID

## 2023-07-28 DIAGNOSIS — M65.4 RADIAL STYLOID TENOSYNOVITIS [DE QUERVAIN]: ICD-10-CM

## 2023-07-28 PROCEDURE — 99214 OFFICE O/P EST MOD 30 MIN: CPT

## 2023-07-28 NOTE — HISTORY OF PRESENT ILLNESS
[Constant] : constant [de-identified] : 7/28/23: f/u MRI R wrist.\par Seeing Neurologist for involuntary jerking movements, has MRI brain scheduled for 8/1/23.\par \par MRI RIGHT WRIST 7/14/23 - IMPRESSION:\par 1. Findings consistent with extensor pollicis longus disruption with approximate 3 cm gap suspected and retraction proximal to the Kirk's tubercle in the proximal wrist.\par 2. Severe de Quervain's tendinopathy and tenosynovitis, mild extensor digitorum tenosynovitis and mild extensor carpi ulnaris tendinitis.\par 3. Partial tearing of the scapholunate ligament and degeneration and fraying involving the body of the TFCC complex with mild first CMC arthrosis.\par 4. No acute fracture or malalignment.\par \par 6/30/23: 54yo RHD female (not working) presents for RIGHT hand pain and swelling x 2 months. She was washing a glass jug, and it broke, lacerating her hand. She also reports that she has been unable to straighten the tip of her thumb since the injury.\par \par Hx: ?OCD - awaiting psych eval. Involuntary jerking movements. Anxiety. HLD.\par Sx: removal of ovarian cyst. [] : no [FreeTextEntry1] : RIGHT wrist  [FreeTextEntry5] : ZACKERY is here today to follow up on her RIGHT wrist. pt states there are no changes in their symptoms since the last visit. c/o constant pain in wrist as well as R shoulder. +Motrin PRN

## 2023-07-28 NOTE — IMAGING
[de-identified] : RIGHT HAND\par multiple scars throughout dorsal hand.\par skin intact. mild swelling over 1st extensor compartment.\par wrist ROM: good extension, flexion.\par unable to extend thumb IPJ, good FPL. good finger extension, flex to full fist.\par SILT to median, ulnar, radial distribution. \par brisk cap refill all digits.\par no triggering.\par + Finkelstein's test.

## 2023-07-28 NOTE — ASSESSMENT
[FreeTextEntry1] : MRI R wrist reviewed with patient. The condition was explained to the patient.\par Anticipate permanent inability to extend thumb IPJ and retropulse thumb with non-operative treatment.\par Given chronicity of injury, unlikely to be amenable to primary repair, would likely need EIP to EPL tendon transfer.\par \par Discussed risks and benefits of treatment options for tenosynovitis - activity modification, NSAID, splint, steroid injection, or surgery.\par \par Discussed the risks and benefits of surgical vs non-surgical treatment. Patient elected to proceed with surgery.\par Discussed the risk of bleeding, infection - which may require antibiotics or surgical debridement - persistent pain, swelling, stiffness, loss of motion, weakness. Risk of injury to tendons, blood vessels, and nerves - which may cause loss of function or loss of limb. Risk of scar tenderness, hypersensitivity, cold sensitivity, and CRPS. \par With regards to EIP to EPL tendon transfer:\par - Risk of re-rupture, which may require revision surgery. Risk of tendon adhesions that may limit motion. May require additional surgery to optimize outcome, in particular tenolysis or contracture release. \par With regards to DeQuervain's release:\par - Risk of tendon subluxation, which may require retinacular repair. Risk of injury to surrounding nerves - in particular the radial sensory nerve - which may cause pain, numbness, tingling, hypersensitivity.\par May require hand therapy post-operatively to optimize range of motion and function. \par Surgery also carries a risk of complications related to anesthesia.\par Discussed the importance of strict adherence to post-op immobilization, restrictions, and rehab protocol to optimize outcome.\par All patient questions were answered. Patient expressed understanding and would like to proceed with RIGHT EIP to EPL tendon transfer, DeQuervain's release.\par \par Will need Neurology clearance for surgery.\par \par F/u after surgery.

## 2023-08-02 ENCOUNTER — APPOINTMENT (OUTPATIENT)
Dept: INTERNAL MEDICINE | Facility: CLINIC | Age: 53
End: 2023-08-02

## 2023-08-08 ENCOUNTER — APPOINTMENT (OUTPATIENT)
Dept: INTERNAL MEDICINE | Facility: CLINIC | Age: 53
End: 2023-08-08
Payer: MEDICAID

## 2023-08-08 VITALS — BODY MASS INDEX: 24.92 KG/M2 | WEIGHT: 146 LBS | HEIGHT: 64 IN

## 2023-08-08 DIAGNOSIS — M62.838 OTHER MUSCLE SPASM: ICD-10-CM

## 2023-08-08 PROCEDURE — 99213 OFFICE O/P EST LOW 20 MIN: CPT

## 2023-08-08 RX ORDER — CYCLOBENZAPRINE HYDROCHLORIDE 5 MG/1
5 TABLET, FILM COATED ORAL
Qty: 5 | Refills: 0 | Status: ACTIVE | COMMUNITY
Start: 2023-08-08 | End: 1900-01-01

## 2023-08-08 NOTE — HISTORY OF PRESENT ILLNESS
[FreeTextEntry8] : ZACKERY MARTELL is a 53 year old female who presents foe evaluation of neck pain. Right posterior neck feeling very "tight" for the past 2 weeks. Thinks she has a muscle spasm. No apparent injury.  Took OTC Motrin a few times, did not help. Pain ~6/10 at worst, especially when tries to turn her neck to either side. No radiating pain or any numbness/tingling.

## 2023-08-08 NOTE — PLAN
[FreeTextEntry1] : Trapezius spasm--advised on heating pad, Motrin ok for pain with food; Cyclobenzaprine at night as needed for spasm, informed on potential sedative effect of muscle relaxer. Can try to get a good massasge for her neck.

## 2023-08-08 NOTE — PHYSICAL EXAM
[No Respiratory Distress] : no respiratory distress  [Normal Gait] : normal gait [Normal] : affect was normal and insight and judgment were intact [de-identified] : tight spasm of right side of trapezius [de-identified] : random jerking motions of neck, armhands

## 2023-08-25 ENCOUNTER — RX RENEWAL (OUTPATIENT)
Age: 53
End: 2023-08-25

## 2023-08-25 RX ORDER — SERTRALINE 25 MG/1
25 TABLET, FILM COATED ORAL
Qty: 30 | Refills: 3 | Status: ACTIVE | COMMUNITY
Start: 2022-02-13 | End: 1900-01-01

## 2023-08-28 ENCOUNTER — TRANSCRIPTION ENCOUNTER (OUTPATIENT)
Age: 53
End: 2023-08-28

## 2023-09-08 ENCOUNTER — NON-APPOINTMENT (OUTPATIENT)
Age: 53
End: 2023-09-08

## 2023-09-15 ENCOUNTER — APPOINTMENT (OUTPATIENT)
Dept: ORTHOPEDIC SURGERY | Facility: HOSPITAL | Age: 53
End: 2023-09-15

## 2023-09-22 ENCOUNTER — APPOINTMENT (OUTPATIENT)
Dept: ORTHOPEDIC SURGERY | Facility: CLINIC | Age: 53
End: 2023-09-22

## 2023-10-31 ENCOUNTER — APPOINTMENT (OUTPATIENT)
Dept: NEUROLOGY | Facility: CLINIC | Age: 53
End: 2023-10-31

## 2024-01-24 ENCOUNTER — APPOINTMENT (OUTPATIENT)
Dept: NEUROLOGY | Facility: CLINIC | Age: 54
End: 2024-01-24

## 2024-02-09 ENCOUNTER — APPOINTMENT (OUTPATIENT)
Dept: ORTHOPEDIC SURGERY | Facility: CLINIC | Age: 54
End: 2024-02-09
Payer: COMMERCIAL

## 2024-02-09 PROCEDURE — 99213 OFFICE O/P EST LOW 20 MIN: CPT

## 2024-02-09 NOTE — IMAGING
[de-identified] : RIGHT HAND multiple scars throughout dorsal hand. surgical scars over dorsal wrist, dorsal 2nd MPJ, dorsal thumb metacarpal. skin intact. no TTP. wrist ROM: good extension, flexion. thumb IPJ extension almost to neutral, good flexion. good finger extension, flex to full fist. SILT to median, ulnar, radial distribution.  brisk cap refill all digits. no triggering.

## 2024-02-09 NOTE — HISTORY OF PRESENT ILLNESS
[de-identified] : 2/9/24: f/u RIGHT EPL laceration, DeQuervain's tenosynovitis. Surgery cancelled due to loss of insurance. Patient reports that she underwent surgery in [facility], brought outside office notes on phone for review: - Underwent RIGHT EPL reconstruction with EIP tendon transfer (4x Pulvertaft) on 10/28/23. s/p thum bspica cast x 4 weeks. Plan for PWB at 4 weeks, FWB at 3 months. Reports that she attended therapy 1x/week for 2 months in [place], last visit on 1/27/24. c/o persistent stiffness and weakness, requesting additional therapy.   7/28/23: f/u MRI R wrist. Seeing Neurologist for involuntary jerking movements, has MRI brain scheduled for 8/1/23.  MRI RIGHT WRIST 7/14/23 - IMPRESSION: 1. Findings consistent with extensor pollicis longus disruption with approximate 3 cm gap suspected and retraction proximal to the Kirk's tubercle in the proximal wrist. 2. Severe de Quervain's tendinopathy and tenosynovitis, mild extensor digitorum tenosynovitis and mild extensor carpi ulnaris tendinitis. 3. Partial tearing of the scapholunate ligament and degeneration and fraying involving the body of the TFCC complex with mild first CMC arthrosis. 4. No acute fracture or malalignment.  6/30/23: 52yo RHD female (not working) presents for RIGHT hand pain and swelling x 2 months. She was washing a glass jug, and it broke, lacerating her hand. She also reports that she has been unable to straighten the tip of her thumb since the injury.  Hx: ?OCD - awaiting psych eval. Involuntary jerking movements. Anxiety. HLD. Sx: removal of ovarian cyst. [FreeTextEntry1] : RIGHT thumb [FreeTextEntry5] : ZACKERY is here today to follow up on her RIGHT thumb. pt states she had sx on RIGHT thumb on 11/30/23 in [facility]. c/o some stiffness. she would like to attend therapy. pt denies pain today.

## 2024-03-22 ENCOUNTER — APPOINTMENT (OUTPATIENT)
Dept: ORTHOPEDIC SURGERY | Facility: CLINIC | Age: 54
End: 2024-03-22

## 2024-05-16 ENCOUNTER — APPOINTMENT (OUTPATIENT)
Dept: ORTHOPEDIC SURGERY | Facility: CLINIC | Age: 54
End: 2024-05-16
Payer: COMMERCIAL

## 2024-05-16 DIAGNOSIS — S66.221A: ICD-10-CM

## 2024-05-16 PROCEDURE — 99213 OFFICE O/P EST LOW 20 MIN: CPT

## 2024-05-16 NOTE — HISTORY OF PRESENT ILLNESS
[de-identified] : 5/16/24: f/u RIGHT EIP to EPL tendon transfer by outside surgeon. Notes that the thumb does not extend/abduct as far as contralateral side, but otherwise doing well. Completed OT 2x/wk @[facility].  2/9/24: f/u RIGHT EPL laceration, DeQuervain's tenosynovitis. Surgery cancelled due to loss of insurance. Patient reports that she underwent surgery in [facility], brought outside office notes on phone for review: - Underwent RIGHT EPL reconstruction with EIP tendon transfer (4x Pulvertaft) on 10/28/23. s/p thum bspica cast x 4 weeks. Plan for PWB at 4 weeks, FWB at 3 months. Reports that she attended therapy 1x/week for 2 months in [place], last visit on 1/27/24. c/o persistent stiffness and weakness, requesting additional therapy.   7/28/23: f/u MRI R wrist. Seeing Neurologist for involuntary jerking movements, has MRI brain scheduled for 8/1/23.  MRI RIGHT WRIST 7/14/23 - IMPRESSION: 1. Findings consistent with extensor pollicis longus disruption with approximate 3 cm gap suspected and retraction proximal to the Kirk's tubercle in the proximal wrist. 2. Severe de Quervain's tendinopathy and tenosynovitis, mild extensor digitorum tenosynovitis and mild extensor carpi ulnaris tendinitis. 3. Partial tearing of the scapholunate ligament and degeneration and fraying involving the body of the TFCC complex with mild first CMC arthrosis. 4. No acute fracture or malalignment.  6/30/23: 52yo RHD female (not working) presents for RIGHT hand pain and swelling x 2 months. She was washing a glass jug, and it broke, lacerating her hand. She also reports that she has been unable to straighten the tip of her thumb since the injury.  Hx: ?OCD - awaiting psych eval. Involuntary jerking movements. Anxiety. HLD. Sx: removal of ovarian cyst. [FreeTextEntry1] : RIGHT thumb [FreeTextEntry5] : ZACKERY is here today to follow up on her RIGHT thumb. pt states since last visit, pain decreased. c/o some stiffness in thumb. attending therapy.

## 2024-05-16 NOTE — IMAGING
[de-identified] : RIGHT HAND multiple scars throughout dorsal hand. surgical scars over dorsal wrist, dorsal 2nd MPJ, dorsal thumb metacarpal. skin intact. no TTP. wrist ROM: good extension, flexion. thumb IPJ extension almost to neutral, good flexion. good finger extension, flex to full fist. SILT to median, ulnar, radial distribution.  brisk cap refill all digits. no triggering.

## 2024-05-24 ENCOUNTER — EMERGENCY (EMERGENCY)
Facility: HOSPITAL | Age: 54
LOS: 1 days | Discharge: HOME | End: 2024-05-24
Attending: INTERNAL MEDICINE | Admitting: EMERGENCY MEDICINE
Payer: COMMERCIAL

## 2024-05-24 VITALS
HEIGHT: 64 IN | WEIGHT: 152.12 LBS | SYSTOLIC BLOOD PRESSURE: 129 MMHG | TEMPERATURE: 98 F | RESPIRATION RATE: 16 BRPM | OXYGEN SATURATION: 95 % | DIASTOLIC BLOOD PRESSURE: 67 MMHG | HEART RATE: 84 BPM

## 2024-05-24 VITALS
HEART RATE: 61 BPM | SYSTOLIC BLOOD PRESSURE: 150 MMHG | DIASTOLIC BLOOD PRESSURE: 70 MMHG | OXYGEN SATURATION: 96 % | RESPIRATION RATE: 18 BRPM | TEMPERATURE: 98 F

## 2024-05-24 LAB
ALBUMIN SERPL ELPH-MCNC: 4.2 G/DL — SIGNIFICANT CHANGE UP (ref 3.3–5)
ALP SERPL-CCNC: 91 U/L — SIGNIFICANT CHANGE UP (ref 30–120)
ALT FLD-CCNC: 18 U/L — SIGNIFICANT CHANGE UP (ref 10–60)
ANION GAP SERPL CALC-SCNC: 10 MMOL/L — SIGNIFICANT CHANGE UP (ref 5–17)
APPEARANCE UR: ABNORMAL
AST SERPL-CCNC: 14 U/L — SIGNIFICANT CHANGE UP (ref 10–40)
BACTERIA # UR AUTO: NEGATIVE /HPF — SIGNIFICANT CHANGE UP
BASOPHILS # BLD AUTO: 0.05 K/UL — SIGNIFICANT CHANGE UP (ref 0–0.2)
BASOPHILS NFR BLD AUTO: 0.6 % — SIGNIFICANT CHANGE UP (ref 0–2)
BILIRUB SERPL-MCNC: 0.6 MG/DL — SIGNIFICANT CHANGE UP (ref 0.2–1.2)
BILIRUB UR-MCNC: NEGATIVE — SIGNIFICANT CHANGE UP
BUN SERPL-MCNC: 16 MG/DL — SIGNIFICANT CHANGE UP (ref 7–23)
CALCIUM SERPL-MCNC: 9.6 MG/DL — SIGNIFICANT CHANGE UP (ref 8.4–10.5)
CHLORIDE SERPL-SCNC: 105 MMOL/L — SIGNIFICANT CHANGE UP (ref 96–108)
CO2 SERPL-SCNC: 28 MMOL/L — SIGNIFICANT CHANGE UP (ref 22–31)
COLOR SPEC: YELLOW — SIGNIFICANT CHANGE UP
CREAT SERPL-MCNC: 0.99 MG/DL — SIGNIFICANT CHANGE UP (ref 0.5–1.3)
DIFF PNL FLD: ABNORMAL
EGFR: 68 ML/MIN/1.73M2 — SIGNIFICANT CHANGE UP
EOSINOPHIL # BLD AUTO: 0.12 K/UL — SIGNIFICANT CHANGE UP (ref 0–0.5)
EOSINOPHIL NFR BLD AUTO: 1.4 % — SIGNIFICANT CHANGE UP (ref 0–6)
EPI CELLS # UR: SIGNIFICANT CHANGE UP
GLUCOSE SERPL-MCNC: 120 MG/DL — HIGH (ref 70–99)
GLUCOSE UR QL: NEGATIVE MG/DL — SIGNIFICANT CHANGE UP
HCT VFR BLD CALC: 40.4 % — SIGNIFICANT CHANGE UP (ref 34.5–45)
HGB BLD-MCNC: 13.4 G/DL — SIGNIFICANT CHANGE UP (ref 11.5–15.5)
IMM GRANULOCYTES NFR BLD AUTO: 0.2 % — SIGNIFICANT CHANGE UP (ref 0–0.9)
KETONES UR-MCNC: ABNORMAL MG/DL
LEUKOCYTE ESTERASE UR-ACNC: NEGATIVE — SIGNIFICANT CHANGE UP
LIDOCAIN IGE QN: 17 U/L — SIGNIFICANT CHANGE UP (ref 16–77)
LYMPHOCYTES # BLD AUTO: 1.62 K/UL — SIGNIFICANT CHANGE UP (ref 1–3.3)
LYMPHOCYTES # BLD AUTO: 19.3 % — SIGNIFICANT CHANGE UP (ref 13–44)
MCHC RBC-ENTMCNC: 30.2 PG — SIGNIFICANT CHANGE UP (ref 27–34)
MCHC RBC-ENTMCNC: 33.2 GM/DL — SIGNIFICANT CHANGE UP (ref 32–36)
MCV RBC AUTO: 91 FL — SIGNIFICANT CHANGE UP (ref 80–100)
MONOCYTES # BLD AUTO: 0.34 K/UL — SIGNIFICANT CHANGE UP (ref 0–0.9)
MONOCYTES NFR BLD AUTO: 4 % — SIGNIFICANT CHANGE UP (ref 2–14)
NEUTROPHILS # BLD AUTO: 6.26 K/UL — SIGNIFICANT CHANGE UP (ref 1.8–7.4)
NEUTROPHILS NFR BLD AUTO: 74.5 % — SIGNIFICANT CHANGE UP (ref 43–77)
NITRITE UR-MCNC: NEGATIVE — SIGNIFICANT CHANGE UP
NRBC # BLD: 0 /100 WBCS — SIGNIFICANT CHANGE UP (ref 0–0)
PH UR: 5.5 — SIGNIFICANT CHANGE UP (ref 5–8)
PLATELET # BLD AUTO: 296 K/UL — SIGNIFICANT CHANGE UP (ref 150–400)
POTASSIUM SERPL-MCNC: 4.2 MMOL/L — SIGNIFICANT CHANGE UP (ref 3.5–5.3)
POTASSIUM SERPL-SCNC: 4.2 MMOL/L — SIGNIFICANT CHANGE UP (ref 3.5–5.3)
PROT SERPL-MCNC: 8 G/DL — SIGNIFICANT CHANGE UP (ref 6–8.3)
PROT UR-MCNC: SIGNIFICANT CHANGE UP MG/DL
RBC # BLD: 4.44 M/UL — SIGNIFICANT CHANGE UP (ref 3.8–5.2)
RBC # FLD: 12.7 % — SIGNIFICANT CHANGE UP (ref 10.3–14.5)
RBC CASTS # UR COMP ASSIST: 7 /HPF — HIGH (ref 0–4)
SODIUM SERPL-SCNC: 143 MMOL/L — SIGNIFICANT CHANGE UP (ref 135–145)
SP GR SPEC: 1.04 — HIGH (ref 1–1.03)
UROBILINOGEN FLD QL: 0.2 MG/DL — SIGNIFICANT CHANGE UP (ref 0.2–1)
WBC # BLD: 8.41 K/UL — SIGNIFICANT CHANGE UP (ref 3.8–10.5)
WBC # FLD AUTO: 8.41 K/UL — SIGNIFICANT CHANGE UP (ref 3.8–10.5)
WBC UR QL: 1 /HPF — SIGNIFICANT CHANGE UP (ref 0–5)

## 2024-05-24 PROCEDURE — 96365 THER/PROPH/DIAG IV INF INIT: CPT | Mod: XU

## 2024-05-24 PROCEDURE — 96361 HYDRATE IV INFUSION ADD-ON: CPT

## 2024-05-24 PROCEDURE — 80053 COMPREHEN METABOLIC PANEL: CPT

## 2024-05-24 PROCEDURE — 99284 EMERGENCY DEPT VISIT MOD MDM: CPT | Mod: 25

## 2024-05-24 PROCEDURE — 83690 ASSAY OF LIPASE: CPT

## 2024-05-24 PROCEDURE — 36415 COLL VENOUS BLD VENIPUNCTURE: CPT

## 2024-05-24 PROCEDURE — 96375 TX/PRO/DX INJ NEW DRUG ADDON: CPT

## 2024-05-24 PROCEDURE — 74177 CT ABD & PELVIS W/CONTRAST: CPT | Mod: MC

## 2024-05-24 PROCEDURE — 74177 CT ABD & PELVIS W/CONTRAST: CPT | Mod: 26,MC

## 2024-05-24 PROCEDURE — 85025 COMPLETE CBC W/AUTO DIFF WBC: CPT

## 2024-05-24 PROCEDURE — 81001 URINALYSIS AUTO W/SCOPE: CPT

## 2024-05-24 PROCEDURE — 99285 EMERGENCY DEPT VISIT HI MDM: CPT

## 2024-05-24 RX ORDER — TAMSULOSIN HYDROCHLORIDE 0.4 MG/1
1 CAPSULE ORAL
Qty: 14 | Refills: 0
Start: 2024-05-24 | End: 2024-06-06

## 2024-05-24 RX ORDER — SODIUM CHLORIDE 9 MG/ML
1000 INJECTION INTRAMUSCULAR; INTRAVENOUS; SUBCUTANEOUS ONCE
Refills: 0 | Status: COMPLETED | OUTPATIENT
Start: 2024-05-24 | End: 2024-05-24

## 2024-05-24 RX ORDER — OXYCODONE HYDROCHLORIDE 5 MG/1
1 TABLET ORAL
Qty: 9 | Refills: 0
Start: 2024-05-24 | End: 2024-05-26

## 2024-05-24 RX ORDER — FAMOTIDINE 10 MG/ML
20 INJECTION INTRAVENOUS ONCE
Refills: 0 | Status: COMPLETED | OUTPATIENT
Start: 2024-05-24 | End: 2024-05-24

## 2024-05-24 RX ORDER — ACETAMINOPHEN 500 MG
1000 TABLET ORAL ONCE
Refills: 0 | Status: COMPLETED | OUTPATIENT
Start: 2024-05-24 | End: 2024-05-24

## 2024-05-24 RX ORDER — CEFUROXIME AXETIL 250 MG
1 TABLET ORAL
Qty: 14 | Refills: 0
Start: 2024-05-24 | End: 2024-05-30

## 2024-05-24 RX ORDER — CEFTRIAXONE 500 MG/1
1000 INJECTION, POWDER, FOR SOLUTION INTRAMUSCULAR; INTRAVENOUS ONCE
Refills: 0 | Status: COMPLETED | OUTPATIENT
Start: 2024-05-24 | End: 2024-05-24

## 2024-05-24 RX ORDER — ONDANSETRON 8 MG/1
4 TABLET, FILM COATED ORAL ONCE
Refills: 0 | Status: COMPLETED | OUTPATIENT
Start: 2024-05-24 | End: 2024-05-24

## 2024-05-24 RX ORDER — KETOROLAC TROMETHAMINE 30 MG/ML
30 SYRINGE (ML) INJECTION ONCE
Refills: 0 | Status: DISCONTINUED | OUTPATIENT
Start: 2024-05-24 | End: 2024-05-24

## 2024-05-24 RX ADMIN — Medication 1000 MILLIGRAM(S): at 19:16

## 2024-05-24 RX ADMIN — SODIUM CHLORIDE 1000 MILLILITER(S): 9 INJECTION INTRAMUSCULAR; INTRAVENOUS; SUBCUTANEOUS at 19:01

## 2024-05-24 RX ADMIN — Medication 400 MILLIGRAM(S): at 19:01

## 2024-05-24 RX ADMIN — Medication 30 MILLIGRAM(S): at 21:40

## 2024-05-24 RX ADMIN — CEFTRIAXONE 1000 MILLIGRAM(S): 500 INJECTION, POWDER, FOR SOLUTION INTRAMUSCULAR; INTRAVENOUS at 22:16

## 2024-05-24 RX ADMIN — Medication 30 MILLIGRAM(S): at 22:16

## 2024-05-24 RX ADMIN — Medication 1000 MILLIGRAM(S): at 19:20

## 2024-05-24 RX ADMIN — CEFTRIAXONE 100 MILLIGRAM(S): 500 INJECTION, POWDER, FOR SOLUTION INTRAMUSCULAR; INTRAVENOUS at 21:40

## 2024-05-24 RX ADMIN — SODIUM CHLORIDE 1000 MILLILITER(S): 9 INJECTION INTRAMUSCULAR; INTRAVENOUS; SUBCUTANEOUS at 20:00

## 2024-05-24 RX ADMIN — FAMOTIDINE 20 MILLIGRAM(S): 10 INJECTION INTRAVENOUS at 19:01

## 2024-05-24 NOTE — ED PROVIDER NOTE - CARE PROVIDER_API CALL
[facility]  Urology  [address] [place] [id number]  Phone: [phone]  Fax: [phone]  Follow Up Time:

## 2024-05-24 NOTE — ED ADULT NURSE NOTE - OBJECTIVE STATEMENT
patient A&Ox3 in no acute distress, c/o of abdominal pain started yesterday no N/V no diarrhea no constipation , no dysuria no blood in stool or urine , no chest pain no back pain no difficulty breathing at this time

## 2024-05-24 NOTE — ED PROVIDER NOTE - PATIENT PORTAL LINK FT
You can access the FollowMyHealth Patient Portal offered by [facility] by registering at the following website: [email or web address]. By joining [facility]’s FollowMyHealth portal, you will also be able to view your health information using other applications (apps) compatible with our system.

## 2024-05-24 NOTE — ED ADULT TRIAGE NOTE - SPO2 (%)
95 Z Plasty Text: The lesion was extirpated to the level of the fat with a #15 scalpel blade.  Given the location of the defect, shape of the defect and the proximity to free margins a Z-plasty was deemed most appropriate for repair.  Using a sterile surgical marker, the appropriate transposition arms of the Z-plasty were drawn incorporating the defect and placing the expected incisions within the relaxed skin tension lines where possible.    The area thus outlined was incised deep to adipose tissue with a #15 scalpel blade.  The skin margins were undermined to an appropriate distance in all directions utilizing iris scissors.  The opposing transposition arms were then transposed into place in opposite direction and anchored with interrupted buried subcutaneous sutures.

## 2024-05-24 NOTE — ED PROVIDER NOTE - NSFOLLOWUPINSTRUCTIONS_ED_ALL_ED_FT
Tylenol, ibuprofen for pain.  Oxycodone if needed for additional pain control.   Drink plenty of water.  Take flomax and ceftin as prescribed.  Follow up with urologist.  Return for worsening or concerning symptoms.      Kidney stones are rock-like masses that form inside of the kidneys. Kidneys are organs that make pee (urine). A kidney stone may move into other parts of the urinary tract, including:  The tubes that connect the kidneys to the bladder (ureters).  The bladder.  The tube that carries urine out of the body (urethra).  Kidney stones can cause very bad pain and can block the flow of pee. The stone usually leaves your body through your pee. A doctor may need to take out the stone.    What are the causes?  Kidney stones may be caused by:  Too much calcium in the body. This may be caused by too much parathyroid hormone in the blood.  Uric acid crystals in the bladder. The body makes uric acid when you eat certain foods.  Narrowing of one or both of the ureters.  A kidney blockage that you were born with.  Past surgery on the kidney or the ureters.  What increases the risk?  You are more likely to develop this condition if:  You have had a kidney stone in the past.  Other people in your family have had kidney stones.  You do not drink enough water.  You eat a diet that is high in protein, salt (sodium), or sugar.  You are very overweight (obese).  What are the signs or symptoms?  Symptoms of a kidney stone may include:  Pain in the side of the belly, right below the ribs. Pain usually spreads to the groin.  Needing to pee often or right away.  Pain when peeing.  Blood in your pee.  Feeling like you may vomit (nauseous).  Vomiting.  Fever and chills.  How is this treated?  Treatment depends on the size, location, and makeup of the kidney stones. The stones will often pass out of the body when you pee. You may need to:  Drink more fluid to help pass the stone.  In some cases, you may be given fluids through an IV tube at the hospital.  Take medicine for pain.  Change your diet to help keep kidney stones from coming back.  Sometimes, you may need:  A procedure to break up kidney stones using a beam of light (laser) or shock waves.  Surgery to remove the kidney stones.  Follow these instructions at home:  Medicines    Take over-the-counter and prescription medicines only as told by your doctor.  Ask your doctor if the medicine prescribed to you requires you to avoid driving or using machinery.  Eating and drinking    Drink enough fluid to keep your pee pale yellow.  You may be told to drink at least 8–10 glasses of water each day. This will help you pass the stone.  If told by your doctor, change your diet. You may be told to:  Limit how much salt you eat.  Eat more fruits and vegetables.  Limit how much meat, poultry, fish, and eggs you eat.  Follow instructions from your doctor about what you may eat and drink.  General instructions    Collect pee samples as told by your doctor. You may need to collect a pee sample:  24 hours after a stone comes out.  8–12 weeks after a stone comes out, and every 6–12 months after that.  Strain your pee every time you pee. Use the strainer that your doctor recommends.  Do not throw out the stone. Keep it so that it can be tested by your doctor.  Keep all follow-up visits. You may need X-rays and ultrasounds to make sure the stone has come out.  How is this prevented?  To prevent another kidney stone:  Drink enough fluid to keep your pee pale yellow. This is the best way to prevent kidney stones.  Eat healthy foods.  Avoid certain foods as told by your doctor. You may be told to eat less protein.  Stay at a healthy weight.  Where to find more information  [employer] Kid[employer] (NKF): kidney.org  [employer] (UCF): urologyhealth.org  Contact a doctor if:  You have pain that gets worse or does not get better with medicine.  Get help right away if:  You have a fever or chills.  You get very bad pain.  You get new pain in your belly.  You faint.  You cannot pee.  This information is not intended to replace advice given to you by your health care provider. Make sure you discuss any questions you have with your health care provider.

## 2024-05-24 NOTE — ED ADULT NURSE NOTE - CHIEF COMPLAINT QUOTE
54 y/o female presents axo4 ambulatory c/o abd pain since last night, was seen at urgent care today, sent to the ed for ct imaging. denies n/v/d/sob/cp.

## 2024-05-24 NOTE — ED PROVIDER NOTE - CLINICAL SUMMARY MEDICAL DECISION MAKING FREE TEXT BOX
53 F referred from urgent care for right sided abd pain since last night.  VSS, Exam right flank pain, CT reporting right urethral stone. Pain was  controlled and patient discharged with  Oxycodone,  Flomax, Ceftin and urology referral.

## 2024-05-24 NOTE — ED PROVIDER NOTE - OBJECTIVE STATEMENT
53 F referred from urgent care for right sided abd pain since last night. Occasional nausea, no vomiting. Denies f/c, cp, sob, diarrhea, urinary complaints, prior abdominal surgery. Was told she needs a CT scan.

## 2024-05-24 NOTE — ED ADULT TRIAGE NOTE - CHIEF COMPLAINT QUOTE
52 y/o female presents axo4 ambulatory c/o abd pain since last night, was seen at urgent care today, sent to the ed for ct imaging. denies n/v/d/sob/cp.

## 2024-05-24 NOTE — ED ADULT NURSE REASSESSMENT NOTE - NS ED NURSE REASSESS COMMENT FT1
patient A&Ox3 in no acute distress no pain at this time discharge as orders heplock remove left  ER self ambulated with son on her side

## 2024-06-04 ENCOUNTER — APPOINTMENT (OUTPATIENT)
Dept: UROLOGY | Facility: CLINIC | Age: 54
End: 2024-06-04
Payer: COMMERCIAL

## 2024-06-04 ENCOUNTER — LABORATORY RESULT (OUTPATIENT)
Age: 54
End: 2024-06-04

## 2024-06-04 VITALS
RESPIRATION RATE: 14 BRPM | OXYGEN SATURATION: 97 % | DIASTOLIC BLOOD PRESSURE: 59 MMHG | WEIGHT: 144.25 LBS | HEART RATE: 81 BPM | BODY MASS INDEX: 24.63 KG/M2 | TEMPERATURE: 98 F | HEIGHT: 64 IN | SYSTOLIC BLOOD PRESSURE: 105 MMHG

## 2024-06-04 DIAGNOSIS — N20.0 CALCULUS OF KIDNEY: ICD-10-CM

## 2024-06-04 DIAGNOSIS — Z78.9 OTHER SPECIFIED HEALTH STATUS: ICD-10-CM

## 2024-06-04 DIAGNOSIS — Z80.3 FAMILY HISTORY OF MALIGNANT NEOPLASM OF BREAST: ICD-10-CM

## 2024-06-04 DIAGNOSIS — Z82.49 FAMILY HISTORY OF ISCHEMIC HEART DISEASE AND OTHER DISEASES OF THE CIRCULATORY SYSTEM: ICD-10-CM

## 2024-06-04 PROCEDURE — 99204 OFFICE O/P NEW MOD 45 MIN: CPT

## 2024-06-04 PROCEDURE — 51798 US URINE CAPACITY MEASURE: CPT

## 2024-06-04 NOTE — HISTORY OF PRESENT ILLNESS
[FreeTextEntry1] : ZACKERY MARTELL is a 53 year female who presents with R kidney stone - 3mm. She complains of abd pain for many days. Ms. MARTELL has been treated with no meds. Denies fever, chills, nausea, vomiting. [Urinary Incontinence] : no urinary incontinence [Urinary Retention] : no urinary retention [Urinary Urgency] : no urinary urgency [Urinary Frequency] : no urinary frequency [Nocturia] : no nocturia [Straining] : no straining [Weak Stream] : no weak stream [Intermittency] : no intermittency

## 2024-06-04 NOTE — ASSESSMENT
[FreeTextEntry1] : pvr 0   1. kidney stone - pt deferred MET - has hx of falls. Will reassess stone position w/ renal us. If pt does not pass stone in 2 weeks, will plan for URS/ LL. Risks reviewed. ER warning signs reviewed.

## 2024-06-04 NOTE — PHYSICAL EXAM
[General Appearance - Well Developed] : well developed [General Appearance - Well Nourished] : well nourished [] : no respiratory distress [Abdomen Soft] : soft [Skin Color & Pigmentation] : normal skin color and pigmentation [No Focal Deficits] : no focal deficits [de-identified] : pt deferred

## 2024-06-05 LAB
APPEARANCE: CLEAR
BILIRUBIN URINE: NEGATIVE
BLOOD URINE: ABNORMAL
COLOR: YELLOW
GLUCOSE QUALITATIVE U: NEGATIVE MG/DL
KETONES URINE: NEGATIVE MG/DL
LEUKOCYTE ESTERASE URINE: NEGATIVE
NITRITE URINE: NEGATIVE
PH URINE: 6
PROTEIN URINE: NEGATIVE MG/DL
SPECIFIC GRAVITY URINE: 1.01
UROBILINOGEN URINE: 0.2 MG/DL

## 2024-06-10 LAB — URINE CYTOLOGY: NORMAL

## 2024-06-18 ENCOUNTER — APPOINTMENT (OUTPATIENT)
Dept: ULTRASOUND IMAGING | Facility: CLINIC | Age: 54
End: 2024-06-18

## 2024-06-25 ENCOUNTER — APPOINTMENT (OUTPATIENT)
Dept: UROLOGY | Facility: CLINIC | Age: 54
End: 2024-06-25

## 2024-07-01 ENCOUNTER — APPOINTMENT (OUTPATIENT)
Dept: UROLOGY | Facility: CLINIC | Age: 54
End: 2024-07-01

## 2024-07-11 ENCOUNTER — APPOINTMENT (OUTPATIENT)
Dept: UROLOGY | Facility: CLINIC | Age: 54
End: 2024-07-11
Payer: COMMERCIAL

## 2024-07-11 VITALS — HEART RATE: 80 BPM | OXYGEN SATURATION: 97 % | SYSTOLIC BLOOD PRESSURE: 111 MMHG | DIASTOLIC BLOOD PRESSURE: 63 MMHG

## 2024-07-11 DIAGNOSIS — N20.1 CALCULUS OF URETER: ICD-10-CM

## 2024-07-11 PROCEDURE — 99214 OFFICE O/P EST MOD 30 MIN: CPT

## 2024-07-17 ENCOUNTER — APPOINTMENT (OUTPATIENT)
Dept: ULTRASOUND IMAGING | Facility: CLINIC | Age: 54
End: 2024-07-17

## 2024-07-18 RX ORDER — OXYCODONE 5 MG/1
5 TABLET ORAL
Qty: 10 | Refills: 0 | Status: ACTIVE | COMMUNITY
Start: 2024-07-18 | End: 1900-01-01

## 2024-07-18 RX ORDER — IBUPROFEN 600 MG/1
600 TABLET, FILM COATED ORAL 3 TIMES DAILY
Qty: 30 | Refills: 0 | Status: ACTIVE | COMMUNITY
Start: 2024-07-18 | End: 1900-01-01

## 2024-07-18 RX ORDER — ACETAMINOPHEN 500 MG/1
500 TABLET ORAL EVERY 8 HOURS
Qty: 60 | Refills: 3 | Status: ACTIVE | COMMUNITY
Start: 2024-07-18 | End: 1900-01-01

## 2024-07-19 ENCOUNTER — RX RENEWAL (OUTPATIENT)
Age: 54
End: 2024-07-19

## 2024-07-30 ENCOUNTER — OUTPATIENT (OUTPATIENT)
Dept: OUTPATIENT SERVICES | Facility: HOSPITAL | Age: 54
LOS: 1 days | End: 2024-07-30
Payer: COMMERCIAL

## 2024-07-30 ENCOUNTER — APPOINTMENT (OUTPATIENT)
Dept: ULTRASOUND IMAGING | Facility: CLINIC | Age: 54
End: 2024-07-30
Payer: COMMERCIAL

## 2024-07-30 DIAGNOSIS — N20.0 CALCULUS OF KIDNEY: ICD-10-CM

## 2024-07-30 PROCEDURE — 76770 US EXAM ABDO BACK WALL COMP: CPT

## 2024-07-30 PROCEDURE — 76770 US EXAM ABDO BACK WALL COMP: CPT | Mod: 26

## 2024-08-27 ENCOUNTER — RX CHANGE (OUTPATIENT)
Age: 54
End: 2024-08-27

## 2024-08-27 RX ORDER — SERTRALINE 25 MG/1
25 TABLET, FILM COATED ORAL
Qty: 90 | Refills: 2 | Status: ACTIVE | COMMUNITY
Start: 1900-01-01 | End: 1900-01-01

## 2025-01-22 ENCOUNTER — APPOINTMENT (OUTPATIENT)
Dept: UROLOGY | Facility: CLINIC | Age: 55
End: 2025-01-22

## 2025-04-01 ENCOUNTER — NON-APPOINTMENT (OUTPATIENT)
Age: 55
End: 2025-04-01

## 2025-05-13 NOTE — ED ADULT NURSE NOTE - HOW OFTEN DO YOU HAVE A DRINK CONTAINING ALCOHOL?
In an effort to ensure that our patients [employer], a Team Member has reviewed your chart and identified an opportunity to provide the best care possible. An attempt was made to discuss or schedule due or overdue Preventive or Chronic Condition care.Care Gaps identified: Wellness Visits.    The Outcome was Contact was made, appointment declined.   Type of Appointment needed: Primary Care Visit    2nd attempt - per patient already established with Dr Skinny Casey of [facility]. Updated our records.   Never

## 2025-05-16 ENCOUNTER — APPOINTMENT (OUTPATIENT)
Dept: NEUROLOGY | Facility: CLINIC | Age: 55
End: 2025-05-16